# Patient Record
Sex: FEMALE | Race: WHITE
[De-identification: names, ages, dates, MRNs, and addresses within clinical notes are randomized per-mention and may not be internally consistent; named-entity substitution may affect disease eponyms.]

---

## 2018-05-12 ENCOUNTER — HOSPITAL ENCOUNTER (EMERGENCY)
Dept: HOSPITAL 25 - ED | Age: 48
Discharge: HOME | End: 2018-05-12
Payer: SELF-PAY

## 2018-05-12 VITALS — DIASTOLIC BLOOD PRESSURE: 75 MMHG | SYSTOLIC BLOOD PRESSURE: 144 MMHG
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
## 2018-05-12 DIAGNOSIS — S43.014A: Primary | ICD-10-CM

## 2018-05-12 DIAGNOSIS — Z88.0: ICD-10-CM

## 2018-05-12 PROCEDURE — 96374 THER/PROPH/DIAG INJ IV PUSH: CPT

## 2018-05-12 PROCEDURE — 96375 TX/PRO/DX INJ NEW DRUG ADDON: CPT

## 2018-05-12 PROCEDURE — 23650 CLTX SHO DSLC W/MNPJ WO ANES: CPT

## 2018-05-12 PROCEDURE — 96376 TX/PRO/DX INJ SAME DRUG ADON: CPT

## 2018-05-12 PROCEDURE — 99285 EMERGENCY DEPT VISIT HI MDM: CPT

## 2018-05-12 RX ADMIN — MORPHINE SULFATE ONE MG: 4 INJECTION INTRAVENOUS at 15:26

## 2018-05-12 RX ADMIN — MORPHINE SULFATE ONE MG: 4 INJECTION INTRAVENOUS at 15:07

## 2018-05-12 NOTE — ED
Tj DOWNS Stephanie, scribed for Samir Mckay MD on 05/12/18 at 1459 .





Upper Extremity Pain





- HPI Summary


HPI Summary: 


The pt is a 46 y/o F BIBA to the ED with c/o R shoulder pain that began at 15:

31 today s/p trip and fall. The pt states her pain is located at the top of her 

shoulder near the head of the humerus. Symptoms include R hand numbness. She 

denies R hand pain and pain over her R clavicle.  The pt denies head trauma. 

The pt stated she landed with her arm extended at the shoulder joint flexed at 

the elbow. 








- History of Current Complaint


Chief Complaint: EDSkatharineTimothy


Stated Complaint: RT SHOULDER PAIN


Time Seen by Provider: 05/12/18 14:57


Hx Obtained From: Patient


Mechanism Of Injury: Fall From A Standing Position


Onset/Duration: Started Minutes Ago, Still Present


Timing: Constant


Pain Location: Shoulder - R


Aggravating Factor(s): Movement


Alleviating Factor(s): Nothing


Associated Signs & Symptoms: Positive: Numbness/Tingling - R hand





- Allergies/Home Medications


Allergies/Adverse Reactions: 


 Allergies











Allergy/AdvReac Type Severity Reaction Status Date / Time


 


Penicillins Allergy  Unknown Verified 05/12/18 14:52





   Reaction  





   Details  














PMH/Surg Hx/FS Hx/Imm Hx


Sensory History: 


   Denies: Hx Legally Blind


EENT History: 


   Denies: Hx Deafness





- Family History


Known Family History: 


   Negative: Renal Disease





- Social History


Occupation: Unemployed


Lives: Alone





Review of Systems


Negative: Fever


Musculoskeletal: Negative - R hand pain


Positive: Other - R shoulder pain


Positive: Numbness - R hand.  Negative: Slurred Speech


All Other Systems Reviewed And Are Negative: Yes





Physical Exam





- Summary


Physical Exam Summary: 


General: well-appearing, no pain distress


Skin: warm, color reflects adequate perfusion, dry


Head: normal


Eyes: EOMI, DAVID


ENT: normal


Neck: supple, nontender


Respiratory: CTA, breath sounds present


Cardiovascular: RRR


Abdomen: soft, nontender


Bowel: present


Musculoskeletal: strength intact, tender to R shoulder


Neurological: sensory/motor intact, A&O x3


Psychological: affect/mood appropriate








Triage Information Reviewed: Yes


Vital Signs On Initial Exam: 


 Initial Vitals











Resp


 


 22 


 


 05/12/18 15:07











Vital Signs Reviewed: Yes





Diagnostics





- Vital Signs


 Vital Signs











  Temp Pulse Resp BP Pulse Ox


 


 05/12/18 18:46   76   116/82  96


 


 05/12/18 18:30   72   116/79  97


 


 05/12/18 18:15   71   103/80  97


 


 05/12/18 18:01   75    95


 


 05/12/18 18:00   76   133/95  94


 


 05/12/18 17:59   73   118/97  95


 


 05/12/18 17:57   76   129/96  93


 


 05/12/18 17:55   78   122/97  96


 


 05/12/18 17:53   76   130/97  96


 


 05/12/18 17:51   75   131/101  93


 


 05/12/18 17:49   79   116/95  94


 


 05/12/18 17:47   78   115/85  97


 


 05/12/18 17:45   78   127/80  94


 


 05/12/18 17:43   78   118/102  94


 


 05/12/18 17:41   78   119/93  94


 


 05/12/18 17:39   81   147/96  95


 


 05/12/18 17:37   84   130/100  95


 


 05/12/18 17:35   81   136/103  94


 


 05/12/18 17:32   84   140/104  93


 


 05/12/18 17:31   84   123/97  98


 


 05/12/18 17:28   76   119/101  96


 


 05/12/18 17:27   75   128/80  100


 


 05/12/18 17:25   79   121/100  100


 


 05/12/18 17:23   77   150/92  100


 


 05/12/18 17:21   78   141/84  100


 


 05/12/18 17:04   77   129/87  100


 


 05/12/18 17:00   70    95


 


 05/12/18 16:50   70   138/109  96


 


 05/12/18 16:29   65    98


 


 05/12/18 16:16    18  


 


 05/12/18 15:50   75   134/87  100


 


 05/12/18 15:26    14  


 


 05/12/18 15:20   68   141/86  98


 


 05/12/18 15:13  98 F  69  20  160/70  99


 


 05/12/18 15:11   81    99


 


 05/12/18 15:07    22  














- Laboratory


Lab Statement: Any lab studies that have been ordered have been reviewed, and 

results considered in the medical decision making process.





- Radiology


  ** Shoulder XRay


Xray Interpretation: Positive (See Comments)


Radiology Interpretation Completed By: Radiologist - Suboptimal views with 

likely anterior inferior dislocation of the humerus head. ED physician has 

reviewed this report.





Course/Dx





- Course


Course Of Treatment: MODERATE SEDATION PERFORMED IN ED BY MYSELF; SEE MODERATE 

SEDATION PAPERWORK. SHOULDER REDUCED BY MID LEVEL. PATIENT REPORTS SOME 

TINGLING IN THE MEDIAN NERVE DISTRIBUTIN BEFORE AND AFTER THE REDUCTION. 

OTHERWISE VASCULAR/NEUROLOGICALLY INTACT AFTER REDUCTION.  F/U ORTHO; RETURN IF 

WORSE.





- Diagnoses


Provider Diagnoses: 


 Dislocation of right shoulder joint








- Critical Care Time


Critical Care Time: 30-74 min





Discharge





- Sign-Out/Discharge


Documenting (check all that apply): Discharge/Admit/Transfer





- Discharge Plan


Condition: Stable


Disposition: HOME


Prescriptions: 


HYDROcodone/ACETAMIN 5-325 MG* [Norco 5-325 TAB*] 1 tab PO Q4H PRN #20 tab MDD 6


 PRN Reason: Pain


Patient Education Materials:  Shoulder Dislocation Exercises (GEN), Shoulder 

Dislocation (ED), How to Use a Sling (ED)


Referrals: 


Delvis Morin MD [Medical Doctor] - 


[facility] [Provider Group]


Additional Instructions: 


FOLLOW UP WITH ORTHOPEDICS.


CALL MONDAY, 5/14/18, IN THE MORNING TO ARRANGE FOLLOW UP.


RETURN TO THE EMERGENCY DEPARTMENT FOR ANY WORSENING OF YOUR CONDITION OR 

QUESTIONS OR CONCERNS.





- Billing Disposition and Condition


Condition: STABLE


Disposition: HOME





The documentation as recorded by the Tj lara Stephanie accurately reflects 

the service I personally performed and the decisions made by me, Samir Mckay MD.

## 2018-05-12 NOTE — RAD
Indication: Reduction of anterior dislocation.



3 views of the right shoulder demonstrates reduction of previously identified anterior

dislocation of the right humerus. AC joint arthritis is noted.



IMPRESSION: Reduction of previously identified anterior inferior dislocation of the right

humerus.

## 2018-05-12 NOTE — RAD
Indication: Right shoulder pain after fall



3 views of the right shoulder are reviewed. The right humeral head is dislocated. There is

presumed anterior inferior dislocation.



IMPRESSION: Suboptimal views with likely anterior inferior dislocation of the humerus

head.